# Patient Record
Sex: MALE | Race: WHITE
[De-identification: names, ages, dates, MRNs, and addresses within clinical notes are randomized per-mention and may not be internally consistent; named-entity substitution may affect disease eponyms.]

---

## 2022-05-03 PROBLEM — Z00.00 ENCOUNTER FOR PREVENTIVE HEALTH EXAMINATION: Status: ACTIVE | Noted: 2022-05-03

## 2022-05-05 ENCOUNTER — APPOINTMENT (OUTPATIENT)
Dept: ORTHOPEDIC SURGERY | Facility: CLINIC | Age: 77
End: 2022-05-05
Payer: MEDICARE

## 2022-05-05 DIAGNOSIS — M16.10 UNILATERAL PRIMARY OSTEOARTHRITIS, UNSPECIFIED HIP: ICD-10-CM

## 2022-05-05 PROCEDURE — 99203 OFFICE O/P NEW LOW 30 MIN: CPT

## 2022-05-05 RX ORDER — MELOXICAM 15 MG/1
15 TABLET ORAL DAILY
Qty: 30 | Refills: 3 | Status: COMPLETED | COMMUNITY
Start: 2022-05-05 | End: 2022-09-02

## 2022-05-05 NOTE — ASSESSMENT
[FreeTextEntry1] : Patient declined PT. \par \par Santosh has worked in the past, and he will try it again instead of naproxen.

## 2022-05-05 NOTE — PHYSICAL EXAM
MAR called. States that he will down to ER once he finishes his rounds. Pt c/o dizziness, lightheadedness and nausea. [Left] : left hip [4___] : flexion 4[unfilled]/5 [5___] : adduction 5[unfilled]/5 [] : no pain with axial loading of hip [Lateral] : lateral [AP] : anteroposterior [FreeTextEntry9] : DJD right greater than left [TWNoteComboBox7] : flexion 100 degrees [de-identified] : False [TWNoteComboBox6] : False

## 2022-05-05 NOTE — HISTORY OF PRESENT ILLNESS
[de-identified] : Patient is a 76 year old male presents with left hip/groin pain since late April, 2022.  No trauma. No similar symptoms in the past.  Aleve has been helpful.  He can wb without assistance.

## 2023-04-04 ENCOUNTER — OFFICE (OUTPATIENT)
Dept: URBAN - METROPOLITAN AREA CLINIC 109 | Facility: CLINIC | Age: 78
Setting detail: OPHTHALMOLOGY
End: 2023-04-04
Payer: MEDICARE

## 2023-04-04 DIAGNOSIS — H43.813: ICD-10-CM

## 2023-04-04 PROCEDURE — 92014 COMPRE OPH EXAM EST PT 1/>: CPT | Performed by: OPHTHALMOLOGY

## 2023-04-04 ASSESSMENT — TONOMETRY
OD_IOP_MMHG: 14
OS_IOP_MMHG: 14

## 2023-04-04 ASSESSMENT — CONFRONTATIONAL VISUAL FIELD TEST (CVF)
OS_FINDINGS: FULL
OD_FINDINGS: FULL

## 2023-04-04 ASSESSMENT — LID POSITION - PTOSIS: OS_PTOSIS: LUL 1+

## 2023-04-12 ASSESSMENT — REFRACTION_AUTOREFRACTION
OD_CYLINDER: -1.50
OD_SPHERE: -1.50
OS_SPHERE: -1.50
OD_AXIS: 92
OS_AXIS: 88
OS_CYLINDER: -2.00

## 2023-04-12 ASSESSMENT — REFRACTION_MANIFEST
OD_SPHERE: -1.50
OS_VA1: 20/20-
OS_SPHERE: -1.50
OD_AXIS: 95
OD_VA1: 20/20
OD_CYLINDER: -1.25
OD_SPHERE: -1.50
OS_SPHERE: -1.75
OD_AXIS: 95
OS_CYLINDER: -1.50
OD_VA1: 20/20-
OS_AXIS: 95
OS_AXIS: 85
OS_CYLINDER: -1.50
OD_CYLINDER: -2.00
OS_VA1: 20/20

## 2023-04-12 ASSESSMENT — REFRACTION_CURRENTRX
OD_SPHERE: -1.50
OS_AXIS: 79
OS_SPHERE: -1.75
OD_CYLINDER: -1.75
OS_CYLINDER: -1.50
OD_AXIS: 96
OS_OVR_VA: 20/
OD_OVR_VA: 20/

## 2023-04-12 ASSESSMENT — AXIALLENGTH_DERIVED
OS_AL: 25.36
OS_AL: 25.24
OD_AL: 25.14
OD_AL: 25.3
OS_AL: 25.36
OD_AL: 25.19

## 2023-04-12 ASSESSMENT — SPHEQUIV_DERIVED
OD_SPHEQUIV: -2.5
OD_SPHEQUIV: -2.25
OS_SPHEQUIV: -2.25
OD_SPHEQUIV: -2.125
OS_SPHEQUIV: -2.5
OS_SPHEQUIV: -2.5

## 2023-04-12 ASSESSMENT — KERATOMETRY
OD_K2POWER_DIOPTERS: 42.25
OD_K1POWER_DIOPTERS: 41.37
OD_AXISANGLE_DEGREES: 5
OS_AXISANGLE_DEGREES: 1
OS_K2POWER_DIOPTERS: 42.25
OS_K1POWER_DIOPTERS: 41.12

## 2023-04-12 ASSESSMENT — VISUAL ACUITY
OD_BCVA: 20/20-2
OS_BCVA: 20/20-1

## 2023-12-21 ENCOUNTER — NON-APPOINTMENT (OUTPATIENT)
Age: 78
End: 2023-12-21

## 2023-12-21 ENCOUNTER — APPOINTMENT (OUTPATIENT)
Dept: OTOLARYNGOLOGY | Facility: CLINIC | Age: 78
End: 2023-12-21
Payer: MEDICARE

## 2023-12-21 VITALS
SYSTOLIC BLOOD PRESSURE: 121 MMHG | BODY MASS INDEX: 26.28 KG/M2 | HEIGHT: 72 IN | HEART RATE: 57 BPM | WEIGHT: 194 LBS | DIASTOLIC BLOOD PRESSURE: 77 MMHG

## 2023-12-21 DIAGNOSIS — H93.299 OTHER ABNORMAL AUDITORY PERCEPTIONS, UNSPECIFIED EAR: ICD-10-CM

## 2023-12-21 DIAGNOSIS — H61.23 IMPACTED CERUMEN, BILATERAL: ICD-10-CM

## 2023-12-21 DIAGNOSIS — H90.3 SENSORINEURAL HEARING LOSS, BILATERAL: ICD-10-CM

## 2023-12-21 DIAGNOSIS — R42 DIZZINESS AND GIDDINESS: ICD-10-CM

## 2023-12-21 PROCEDURE — 92557 COMPREHENSIVE HEARING TEST: CPT

## 2023-12-21 PROCEDURE — 99203 OFFICE O/P NEW LOW 30 MIN: CPT | Mod: 25

## 2023-12-21 PROCEDURE — G0268 REMOVAL OF IMPACTED WAX MD: CPT

## 2023-12-21 PROCEDURE — 92567 TYMPANOMETRY: CPT

## 2023-12-21 RX ORDER — CARVEDILOL 6.25 MG/1
6.25 TABLET, FILM COATED ORAL
Refills: 0 | Status: ACTIVE | COMMUNITY

## 2023-12-21 RX ORDER — AMLODIPINE BESYLATE 10 MG/1
10 TABLET ORAL
Refills: 0 | Status: ACTIVE | COMMUNITY

## 2023-12-21 RX ORDER — CHOLECALCIFEROL (VITAMIN D3) 25 MCG
TABLET ORAL
Refills: 0 | Status: ACTIVE | COMMUNITY

## 2023-12-21 RX ORDER — CLOPIDOGREL BISULFATE 75 MG/1
75 TABLET, FILM COATED ORAL
Refills: 0 | Status: ACTIVE | COMMUNITY

## 2023-12-21 RX ORDER — IRBESARTAN 300 MG/1
TABLET ORAL
Refills: 0 | Status: ACTIVE | COMMUNITY

## 2023-12-21 RX ORDER — ASPIRIN 81 MG
81 TABLET, DELAYED RELEASE (ENTERIC COATED) ORAL
Refills: 0 | Status: ACTIVE | COMMUNITY

## 2023-12-21 RX ORDER — SIMVASTATIN 80 MG/1
TABLET, FILM COATED ORAL
Refills: 0 | Status: ACTIVE | COMMUNITY

## 2023-12-21 RX ORDER — FINASTERIDE 1 MG/1
TABLET ORAL
Refills: 0 | Status: ACTIVE | COMMUNITY

## 2023-12-21 RX ORDER — LISINOPRIL 30 MG/1
TABLET ORAL
Refills: 0 | Status: ACTIVE | COMMUNITY

## 2023-12-21 NOTE — HISTORY OF PRESENT ILLNESS
[de-identified] : 78 yr old male c/o clogged ears, rare difficulty hearing -tinnitus -hx otitis, noise exp, head trauma, FH   +vertigo several times 40 yrs ago after having Chinese food with MSG. Stopped the MSG and no vertigo until Mar 2023 taken to ER , all tests WNL mid-Dec  in the middle of the night had imbalance

## 2023-12-21 NOTE — PHYSICAL EXAM
[Normal] : lingual tonsils are normal [Midline] : trachea located in midline position [de-identified] : CI AU [de-identified] : bifid uvula

## 2023-12-21 NOTE — REASON FOR VISIT
[Initial Consultation] : an initial consultation for [FreeTextEntry2] : wax and hearing test/ vertigo

## 2023-12-21 NOTE — ASSESSMENT
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

## 2024-04-15 ENCOUNTER — OFFICE (OUTPATIENT)
Dept: URBAN - METROPOLITAN AREA CLINIC 109 | Facility: CLINIC | Age: 79
Setting detail: OPHTHALMOLOGY
End: 2024-04-15
Payer: MEDICARE

## 2024-04-15 DIAGNOSIS — H43.813: ICD-10-CM

## 2024-04-15 DIAGNOSIS — H02.402: ICD-10-CM

## 2024-04-15 PROCEDURE — 92014 COMPRE OPH EXAM EST PT 1/>: CPT | Performed by: OPHTHALMOLOGY

## 2024-04-15 ASSESSMENT — LID POSITION - PTOSIS: OS_PTOSIS: LUL 1+

## 2025-05-05 ENCOUNTER — OFFICE (OUTPATIENT)
Dept: URBAN - METROPOLITAN AREA CLINIC 109 | Facility: CLINIC | Age: 80
Setting detail: OPHTHALMOLOGY
End: 2025-05-05
Payer: MEDICARE

## 2025-05-05 DIAGNOSIS — H02.402: ICD-10-CM

## 2025-05-05 DIAGNOSIS — H43.813: ICD-10-CM

## 2025-05-05 DIAGNOSIS — H11.152: ICD-10-CM

## 2025-05-05 DIAGNOSIS — H43.393: ICD-10-CM

## 2025-05-05 PROCEDURE — 92014 COMPRE OPH EXAM EST PT 1/>: CPT | Performed by: OPHTHALMOLOGY

## 2025-05-05 ASSESSMENT — REFRACTION_MANIFEST
OS_CYLINDER: -1.50
OS_VA1: 20/20
OS_SPHERE: -1.50
OD_AXIS: 95
OS_AXIS: 95
OS_CYLINDER: -1.50
OS_VA1: 20/20-
OD_SPHERE: -1.50
OD_CYLINDER: -2.00
OD_CYLINDER: -1.25
OD_SPHERE: -1.50
OD_VA1: 20/20-
OS_SPHERE: -2.00
OS_AXIS: 80
OD_VA1: 20/20
OD_AXIS: 95

## 2025-05-05 ASSESSMENT — KERATOMETRY
OD_AXISANGLE_DEGREES: 009
OS_AXISANGLE_DEGREES: 2
OD_K2POWER_DIOPTERS: 42.25
OD_K1POWER_DIOPTERS: 41.25
OS_K1POWER_DIOPTERS: 41.37
OS_K2POWER_DIOPTERS: 42.37

## 2025-05-05 ASSESSMENT — REFRACTION_CURRENTRX
OD_SPHERE: -1.50
OD_OVR_VA: 20/
OS_AXIS: 80
OS_OVR_VA: 20/
OD_CYLINDER: -2.00
OD_AXIS: 97
OD_SPHERE: -1.50
OD_AXIS: 95
OS_SPHERE: -1.75
OS_SPHERE: -2.00
OS_OVR_VA: 20/
OS_AXIS: 74
OS_CYLINDER: -1.50
OD_OVR_VA: 20/
OS_CYLINDER: -1.50
OD_CYLINDER: -1.75

## 2025-05-05 ASSESSMENT — REFRACTION_AUTOREFRACTION
OD_AXIS: 100
OS_AXIS: 87
OD_SPHERE: -1.50
OS_SPHERE: -1.50
OS_CYLINDER: -1.75
OD_CYLINDER: -1.75

## 2025-05-05 ASSESSMENT — CONFRONTATIONAL VISUAL FIELD TEST (CVF)
OD_FINDINGS: FULL
OS_FINDINGS: FULL

## 2025-05-05 ASSESSMENT — TONOMETRY
OD_IOP_MMHG: 15
OS_IOP_MMHG: 15

## 2025-05-05 ASSESSMENT — VISUAL ACUITY
OD_BCVA: 20/25-
OS_BCVA: 20/25+

## 2025-05-05 ASSESSMENT — LID POSITION - PTOSIS: OS_PTOSIS: LUL 1+

## 2025-06-18 RX ORDER — MELOXICAM 15 MG/1
15 TABLET ORAL
Qty: 30 | Refills: 2 | Status: ACTIVE | COMMUNITY
Start: 2025-06-18 | End: 1900-01-01